# Patient Record
Sex: MALE | NOT HISPANIC OR LATINO | Employment: FULL TIME | URBAN - METROPOLITAN AREA
[De-identification: names, ages, dates, MRNs, and addresses within clinical notes are randomized per-mention and may not be internally consistent; named-entity substitution may affect disease eponyms.]

---

## 2017-05-31 ENCOUNTER — OFFICE VISIT (OUTPATIENT)
Dept: URGENT CARE | Facility: CLINIC | Age: 47
End: 2017-05-31
Payer: COMMERCIAL

## 2017-05-31 VITALS
TEMPERATURE: 97.9 F | HEIGHT: 69 IN | HEART RATE: 94 BPM | DIASTOLIC BLOOD PRESSURE: 74 MMHG | SYSTOLIC BLOOD PRESSURE: 116 MMHG | WEIGHT: 175 LBS | RESPIRATION RATE: 16 BRPM | OXYGEN SATURATION: 93 % | BODY MASS INDEX: 25.92 KG/M2

## 2017-05-31 DIAGNOSIS — H66.001 ACUTE SUPPURATIVE OTITIS MEDIA OF RIGHT EAR WITHOUT SPONTANEOUS RUPTURE OF TYMPANIC MEMBRANE, RECURRENCE NOT SPECIFIED: Primary | ICD-10-CM

## 2017-05-31 DIAGNOSIS — J06.9 VIRAL URI WITH COUGH: ICD-10-CM

## 2017-05-31 PROCEDURE — 99214 OFFICE O/P EST MOD 30 MIN: CPT | Performed by: PHYSICIAN ASSISTANT

## 2017-05-31 RX ORDER — AZITHROMYCIN 250 MG/1
TABLET, FILM COATED ORAL
Qty: 6 TAB | Refills: 0 | Status: SHIPPED | OUTPATIENT
Start: 2017-05-31 | End: 2023-02-10

## 2017-05-31 RX ORDER — CODEINE PHOSPHATE/GUAIFENESIN 10-100MG/5
10 LIQUID (ML) ORAL 4 TIMES DAILY PRN
Qty: 200 ML | Refills: 0 | Status: SHIPPED | OUTPATIENT
Start: 2017-05-31 | End: 2023-02-10

## 2017-05-31 ASSESSMENT — ENCOUNTER SYMPTOMS
DIZZINESS: 0
COUGH: 1
WHEEZING: 0
SPUTUM PRODUCTION: 1
SORE THROAT: 0
SHORTNESS OF BREATH: 0
FEVER: 0

## 2017-05-31 NOTE — Clinical Note
May 31, 2017         Patient: Austin Holm   YOB: 1970   Date of Visit: 5/31/2017           To Whom it May Concern:    Austin Holm was seen in my clinic on 5/31/2017. He may return to work on 05/31/2017.    If you have any questions or concerns, please don't hesitate to call.        Sincerely,           Nisreen Brown PA-C  Electronically Signed

## 2017-05-31 NOTE — PROGRESS NOTES
Subjective:      Austin Holm is a 46 y.o. male who presents with Cough    PMH:  has a past medical history of Otalgia of left ear (4/10/2014).  MEDS:   Current outpatient prescriptions:   •  Pseudoephedrine-APAP-DM (DAYQUIL PO), Take  by mouth., Disp: , Rfl:   •  Pseudoeph-Doxylamine-DM-APAP (NYQUIL PO), Take  by mouth., Disp: , Rfl:   •  ibuprofen (MOTRIN) 200 MG TABS, Take 200 mg by mouth every 6 hours as needed., Disp: , Rfl:   •  promethazine-codeine (PHENERGAN-CODEINE) 6.25-10 MG/5ML Syrup, Take 5 mL by mouth every 12 hours as needed for Cough., Disp: 150 mL, Rfl: 0  •  benzonatate (TESSALON) 200 MG capsule, Take 1 Cap by mouth 3 times a day as needed for Cough., Disp: 60 Cap, Rfl: 1  •  azithromycin (ZITHROMAX) 250 MG Tab, Take as directed on package. Dispense one package., Disp: 6 Tab, Rfl: 0  •  azithromycin (ZITHROMAX) 250 MG Tab, Use WILFRED as directed, Disp: 6 Tab, Rfl: 0  ALLERGIES: No Known Allergies  SURGHX: History reviewed. No pertinent past surgical history.  SOCHX:  reports that he has been smoking Cigarettes.  He has a 15 pack-year smoking history. He has never used smokeless tobacco. He reports that he does not drink alcohol or use illicit drugs.  FH: family history includes Hypertension in his mother. There is no history of Cancer, Diabetes, Heart Disease, or Stroke. Reviewed with patient/family. Not pertinent to this complaint.            HPI Comments: Patient presents with:  Cough: cough/ nasal congestion since Saturday         Cough  This is a new problem. The current episode started in the past 7 days. The problem has been gradually worsening. The problem occurs every few minutes. The cough is productive of sputum and productive of purulent sputum. Associated symptoms include ear congestion, ear pain, nasal congestion and postnasal drip. Pertinent negatives include no fever, sore throat, shortness of breath or wheezing. The symptoms are aggravated by exercise and lying down. Risk factors for  "lung disease include occupational exposure. He has tried body position changes, OTC cough suppressant and rest for the symptoms. The treatment provided no relief.       Review of Systems   Constitutional: Negative for fever.   HENT: Positive for congestion, ear pain and postnasal drip. Negative for ear discharge and sore throat.    Respiratory: Positive for cough and sputum production. Negative for shortness of breath and wheezing.    Neurological: Negative for dizziness.   All other systems reviewed and are negative.         Objective:     /74 mmHg  Pulse 94  Temp(Src) 36.6 °C (97.9 °F)  Resp 16  Ht 1.74 m (5' 8.5\")  Wt 79.379 kg (175 lb)  BMI 26.22 kg/m2  SpO2 93%     Physical Exam   Constitutional: He appears well-developed and well-nourished. No distress.   HENT:   Head: Normocephalic.   Right Ear: Tympanic membrane is erythematous. A middle ear effusion (purulent fluid behind TM) is present.   Left Ear: Tympanic membrane normal.   Nose: Mucosal edema and rhinorrhea present.   Mouth/Throat: Uvula is midline. Posterior oropharyngeal erythema present. No posterior oropharyngeal edema.   Eyes: Conjunctivae and EOM are normal. Pupils are equal, round, and reactive to light.   Neck: Normal range of motion. Neck supple.   Cardiovascular: Normal rate, regular rhythm and normal heart sounds.    Pulmonary/Chest: Effort normal. He has no decreased breath sounds. He has no wheezes. He has rhonchi. He has no rales.   Abdominal: Soft.   Musculoskeletal: Normal range of motion.   Lymphadenopathy:     He has no cervical adenopathy.   Neurological: He is alert.   Skin: Skin is warm and dry.   Psychiatric: He has a normal mood and affect.   Nursing note and vitals reviewed.         Assessment/Plan:     1. Acute suppurative otitis media of right ear without spontaneous rupture of tympanic membrane, recurrence not specified  azithromycin (ZITHROMAX) 250 MG Tab    guaifenesin-codeine (TUSSI-ORGANIDIN NR) 100-10 MG/5ML " syrup   2. Viral URI with cough  azithromycin (ZITHROMAX) 250 MG Tab    guaifenesin-codeine (TUSSI-ORGANIDIN NR) 100-10 MG/5ML syrup     PT can continue OTC medications, increase fluids and rest until symptoms improve.     PT instructed not to drive or operate heavy machinery while taking this medication as it causes drowsiness.  PT also instructed not to drink alcohol while taking this medication because it contains narcotics. PT verbalized understanding of these instructions.      PT should follow up with PCP in 1-2 days for re-evaluation if symptoms have not improved.  Discussed red flags and reasons to return to UC or ED.  Pt and/or family verbalized understanding of diagnosis and follow up instructions and declined informational handout on diagnosis.  PT discharged.

## 2017-05-31 NOTE — MR AVS SNAPSHOT
"        Austin Holm   2017 8:15 AM   Office Visit   MRN: [id number]    Department:  [facility]   Dept Phone:  [phone]    Description:  Male : 1970   Provider:  Nisreen Brown PA-C           Reason for Visit     Cough cough/ nasal congestion since Saturday       Allergies as of 2017     No Known Allergies      You were diagnosed with     Acute suppurative otitis media of right ear without spontaneous rupture of tympanic membrane, recurrence not specified   [[id number]]  -  Primary     Viral URI with cough   [[id number]]         Vital Signs     Blood Pressure Pulse Temperature Respirations Height Weight    116/74 mmHg 94 36.6 °C (97.9 °F) 16 1.74 m (5' 8.5\") 79.379 kg (175 lb)    Body Mass Index Oxygen Saturation Smoking Status             26.22 kg/m2 93% Current Every Day Smoker         Basic Information     Date Of Birth Sex Race Ethnicity Preferred Language    1970 Male Unknown Non- English      Problem List              ICD-10-CM Priority Class Noted - Resolved    Tobacco abuse Z72.0   2011 - Present    Otalgia of left ear H92.02   4/10/2014 - Present      Health Maintenance        Date Due Completion Dates    IMM DTaP/Tdap/Td Vaccine (1 - Tdap) 1989 ---    IMM PNEUMOCOCCAL 19-64 (ADULT) MEDIUM RISK SERIES (1 of 1 - PPSV23) 1989 ---            Current Immunizations     No immunizations on file.      Below and/or attached are the medications your provider expects you to take. Review all of your home medications and newly ordered medications with your provider and/or pharmacist. Follow medication instructions as directed by your provider and/or pharmacist. Please keep your medication list with you and share with your provider. Update the information when medications are discontinued, doses are changed, or new medications (including over-the-counter products) are added; and carry medication information at all times in the event of emergency situations     Allergies:  " No Known Allergies          Medications  Valid as of: May 31, 2017 -  8:52 AM    Generic Name Brand Name Tablet Size Instructions for use    Azithromycin (Tab) ZITHROMAX 250 MG Use WILFRED as directed        Azithromycin (Tab) ZITHROMAX 250 MG Take as directed on package. Dispense one package.        Azithromycin (Tab) ZITHROMAX 250 MG Take 2 tabs by mouth once today, then one tab by mouth once daily days 2-5.  Complete all medication.        Benzonatate (Cap) TESSALON 200 MG Take 1 Cap by mouth 3 times a day as needed for Cough.        Guaifenesin-Codeine (Syrup) TUSSI-ORGANIDIN -10 MG/5ML Take 10 mL by mouth 4 times a day as needed for Cough.        Ibuprofen (Tab) MOTRIN 200 MG Take 200 mg by mouth every 6 hours as needed.        Promethazine-Codeine (Syrup) PHENERGAN-CODEINE 6.25-10 MG/5ML Take 5 mL by mouth every 12 hours as needed for Cough.        Pseudoeph-Doxylamine-DM-APAP   Take  by mouth.        Pseudoephedrine-APAP-DM   Take  by mouth.        .                 Medicines prescribed today were sent to:     [facility] [id number] [place] [address] - [address] [facility] [address] [facility] [address]    [address] [facility] [address] [facility] [address]    Phone: [phone] Fax: [phone]    Open 24 Hours?: No      Medication refill instructions:       If your prescription bottle indicates you have medication refills left, it is not necessary to call your provider’s office. Please contact your pharmacy and they will refill your medication.    If your prescription bottle indicates you do not have any refills left, you may request refills at any time through one of the following ways: The online Noknoker system (except Urgent Care), by calling your provider’s office, or by asking your pharmacy to contact your provider’s office with a refill request. Medication refills are processed only during regular business hours and may not be available until the next business day. Your provider may request additional  information or to have a follow-up visit with you prior to refilling your medication.   *Please Note: Medication refills are assigned a new Rx number when refilled electronically. Your pharmacy may indicate that no refills were authorized even though a new prescription for the same medication is available at the pharmacy. Please request the medicine by name with the pharmacy before contacting your provider for a refill.           Fotoup Access Code: [email or web address]  Expires: 6/30/2017  8:52 AM    Fotoup  A secure, online tool to manage your health information     [employer]’s Fotoup® is a secure, online tool that connects you to your personalized health information from the privacy of your home -- day or night - making it very easy for you to manage your healthcare. Once the activation process is completed, you can even access your medical information using the Fotoup adriana, which is available for free in the Apple Adriana store or Google Play store.     Fotoup provides the following levels of access (as shown below):   My Chart Features   [facility] Doctor [facility]  Specialists [facility]  Urgent  Care Non-[facility]  Primary Care  Doctor   Email your healthcare team securely and privately 24/7 X X X    Manage appointments: schedule your next appointment; view details of past/upcoming appointments X      Request prescription refills. X      View recent personal medical records, including lab and immunizations X X X X   View health record, including health history, allergies, medications X X X X   Read reports about your outpatient visits, procedures, consult and ER notes X X X X   See your discharge summary, which is a recap of your hospital and/or ER visit that includes your diagnosis, lab results, and care plan. X X       How to register for Fotoup:  1. Go to  [email or web address].  2. Click on the Sign Up Now box, which takes you to the New Member Sign Up page. You will need to provide the following  information:  a. Enter your EatWith Access Code exactly as it appears at the top of this page. (You will not need to use this code after you’ve completed the sign-up process. If you do not sign up before the expiration date, you must request a new code.)   b. Enter your date of birth.   c. Enter your home email address.   d. Click Submit, and follow the next screen’s instructions.  3. Create a EatWith ID. This will be your EatWith login ID and cannot be changed, so think of one that is secure and easy to remember.  4. Create a [place] password. You can change your password at any time.  5. Enter your Password Reset Question and Answer. This can be used at a later time if you forget your password.   6. Enter your e-mail address. This allows you to receive e-mail notifications when new information is available in EatWith.  7. Click Sign Up. You can now view your health information.    For assistance activating your EatWith account, call [phone]        Quit Tobacco Information     Do you want to quit using tobacco?    Quitting tobacco decreases risks of cancer, heart and lung disease, increases life expectancy, improves sense of taste and smell, and increases spending money, among other benefits.    If you are thinking about quitting, we can help.  • [facility] Quit Tobacco Program: 518.971.6717  o Program occurs weekly for four weeks and includes pharmacist consultation on products to support quitting smoking or chewing tobacco. A provider referral is needed for pharmacist consultation.  • Tobacco Users Help Hotline: 7-800-QUIT-NOW (927-1695) or https://[place].quitlogix.org/  o Free, confidential telephone and online coaching for [place] residents. Sessions are designed on a schedule that is convenient for you. Eligible clients receive free nicotine replacement therapy.  • Nationally: www.smokefree.gov  o Information and professional assistance to support both immediate and long-term needs as you become, and remain,  a non-smoker. Smokefree.gov allows you to choose the help that best fits your needs.

## 2018-12-03 ENCOUNTER — OFFICE VISIT (OUTPATIENT)
Dept: URGENT CARE | Facility: CLINIC | Age: 48
End: 2018-12-03

## 2018-12-03 VITALS
BODY MASS INDEX: 28.19 KG/M2 | SYSTOLIC BLOOD PRESSURE: 110 MMHG | OXYGEN SATURATION: 98 % | HEIGHT: 68 IN | DIASTOLIC BLOOD PRESSURE: 70 MMHG | WEIGHT: 186 LBS | TEMPERATURE: 98 F | RESPIRATION RATE: 16 BRPM | HEART RATE: 78 BPM

## 2018-12-03 DIAGNOSIS — H61.23 EXCESSIVE CERUMEN IN BOTH EAR CANALS: ICD-10-CM

## 2018-12-03 PROCEDURE — 99213 OFFICE O/P EST LOW 20 MIN: CPT | Performed by: NURSE PRACTITIONER

## 2018-12-03 ASSESSMENT — ENCOUNTER SYMPTOMS
SORE THROAT: 0
DIZZINESS: 0
WEAKNESS: 0
SINUS PAIN: 0
FEVER: 0
DIAPHORESIS: 0
CHILLS: 0

## 2018-12-04 NOTE — PROGRESS NOTES
"Subjective:      Austin Holm is a 48 y.o. male who presents with Otalgia (possible infection/buildup.)            Patient comes in today with a 2 week history of bilateral ear fullness and decreased hearing.  No other URI symptoms.  He has a history of cerumen impaction.  He has not tried any measures at home to treat the symptoms.        Review of Systems   Constitutional: Negative for chills, diaphoresis, fever and malaise/fatigue.   HENT: Positive for hearing loss. Negative for congestion, ear discharge, ear pain, sinus pain and sore throat.    Neurological: Negative for dizziness and weakness.     Medications, Allergies, and current problem list reviewed today in Epic     Objective:     /70 (BP Location: Left arm, Patient Position: Sitting, BP Cuff Size: Adult)   Pulse 78   Temp 36.7 °C (98 °F) (Temporal)   Resp 16   Ht 1.727 m (5' 8\")   Wt 84.4 kg (186 lb)   SpO2 98%   BMI 28.28 kg/m²      Physical Exam   Constitutional: He is oriented to person, place, and time. He appears well-developed and well-nourished. No distress.   HENT:   Head: Normocephalic.   Nose: Nose normal.   Mouth/Throat: Oropharynx is clear and moist. No oropharyngeal exudate.   Bilateral cerumen impaction.  Ear lavage performed in clinic; patient tolerated well.  TMs intact with no erythema, purulence, effusion, retraction or bulge.  No ear canal swelling, erythema, or trauma.  Hearing grossly intact to voice.   Eyes: Conjunctivae are normal.   Cardiovascular: Normal rate.    Pulmonary/Chest: Effort normal.   Neurological: He is alert and oriented to person, place, and time.   Skin: He is not diaphoretic.   Vitals reviewed.              Assessment/Plan:     1. Excessive cerumen in both ear canals    Discussed exam findings with patient.  May recur.  Trial quarterly ear rinses at home with cerumen softening solution.  Follow up of symptoms recur.  Patient verbalized understanding of and agreed with plan of care.        "

## 2022-05-16 ENCOUNTER — OFFICE VISIT (OUTPATIENT)
Dept: URGENT CARE | Facility: PHYSICIAN GROUP | Age: 52
End: 2022-05-16
Payer: COMMERCIAL

## 2022-05-16 VITALS
BODY MASS INDEX: 28.19 KG/M2 | TEMPERATURE: 98.4 F | HEART RATE: 94 BPM | HEIGHT: 68 IN | RESPIRATION RATE: 16 BRPM | WEIGHT: 186 LBS | DIASTOLIC BLOOD PRESSURE: 74 MMHG | OXYGEN SATURATION: 99 % | SYSTOLIC BLOOD PRESSURE: 140 MMHG

## 2022-05-16 DIAGNOSIS — H61.23 CERUMEN DEBRIS ON TYMPANIC MEMBRANE OF BOTH EARS: ICD-10-CM

## 2022-05-16 PROCEDURE — 99212 OFFICE O/P EST SF 10 MIN: CPT | Performed by: STUDENT IN AN ORGANIZED HEALTH CARE EDUCATION/TRAINING PROGRAM

## 2022-05-16 NOTE — PROGRESS NOTES
Subjective:   CHIEF COMPLAINT  Chief Complaint   Patient presents with   • Ear Fullness     Feels blocked and full. Cannot hear well       HPI  Austin Holm is a 51 y.o. male who presents complaint of his left ear feeling plugged.  Patient has a history of recurrent cerumen impaction and says his current symptoms are consistent with previous impaction.  Symptoms developed proxy 1 week ago.  Says he wears earplugs at work.  He is not having pain.    REVIEW OF SYSTEMS  General: no fever or chills  GI: no nausea or vomiting  See HPI for further details.    PAST MEDICAL HISTORY  Patient Active Problem List    Diagnosis Date Noted   • Otalgia of left ear 04/10/2014   • Tobacco abuse 05/05/2011       SURGICAL HISTORY  patient denies any surgical history    ALLERGIES  No Known Allergies    CURRENT MEDICATIONS  Home Medications     Reviewed by Osman Cronin D.O. (Physician) on 05/16/22 at 1641  Med List Status: <None>   Medication Last Dose Status   azithromycin (ZITHROMAX) 250 MG Tab Taking Active   azithromycin (ZITHROMAX) 250 MG Tab Taking Active   azithromycin (ZITHROMAX) 250 MG Tab Taking Active   benzonatate (TESSALON) 200 MG capsule Taking Active   guaifenesin-codeine (TUSSI-ORGANIDIN NR) 100-10 MG/5ML syrup Taking Active   ibuprofen (MOTRIN) 200 MG TABS Taking Active   promethazine-codeine (PHENERGAN-CODEINE) 6.25-10 MG/5ML Syrup Taking Active   Pseudoeph-Doxylamine-DM-APAP (NYQUIL PO) Taking Active   Pseudoephedrine-APAP-DM (DAYQUIL PO) Taking Active                SOCIAL HISTORY  Social History     Tobacco Use   • Smoking status: Current Every Day Smoker     Packs/day: 0.50     Years: 30.00     Pack years: 15.00     Types: Cigarettes   • Smokeless tobacco: Never Used   Substance and Sexual Activity   • Alcohol use: No   • Drug use: No     Comment: denies h/o heroin, cocaine, meth, IVDU   • Sexual activity: Yes     Partners: Female     Comment: 1 female partner since around 2008       FAMILY HISTORY  Family  "History   Problem Relation Age of Onset   • Hypertension Mother    • Cancer Neg Hx    • Diabetes Neg Hx    • Heart Disease Neg Hx    • Stroke Neg Hx           Objective:   PHYSICAL EXAM  VITAL SIGNS: BP (!) 140/74   Pulse 94   Temp 36.9 °C (98.4 °F) (Temporal)   Resp 16   Ht 1.727 m (5' 8\")   Wt 84.4 kg (186 lb)   SpO2 99%   BMI 28.28 kg/m²     Gen: no acute distress, normal voice  Skin: dry, intact, moist mucosal membranes  ENT: Bilateral cerumen impaction.  Lungs: CTAB w/ symmetric expansion  CV: RRR w/o murmurs or clicks  Psych: normal affect, normal judgement, alert, awake    Assessment/Plan:     1. Cerumen debris on tympanic membrane of both ears     Bilateral ear lavage was performed successfully removing cerumen.  Patient tolerated procedure well.  On repeat exam TMs were clear and intact bilaterally.  Follow-up as needed.    Differential diagnosis, natural history, supportive care, and indications for immediate follow-up discussed. All questions answered. Patient agrees with the plan of care.    Follow-up as needed if symptoms worsen or fail to improve to PCP, Urgent care or Emergency Room.    Please note that this dictation was created using voice recognition software. I have made a reasonable attempt to correct obvious errors, but I expect that there are errors of grammar and possibly content that I did not discover before finalizing the note.         "

## 2023-02-10 ENCOUNTER — APPOINTMENT (OUTPATIENT)
Dept: RADIOLOGY | Facility: IMAGING CENTER | Age: 53
End: 2023-02-10
Attending: NURSE PRACTITIONER
Payer: COMMERCIAL

## 2023-02-10 ENCOUNTER — OFFICE VISIT (OUTPATIENT)
Dept: URGENT CARE | Facility: CLINIC | Age: 53
End: 2023-02-10
Payer: COMMERCIAL

## 2023-02-10 VITALS
TEMPERATURE: 97.9 F | RESPIRATION RATE: 18 BRPM | SYSTOLIC BLOOD PRESSURE: 138 MMHG | WEIGHT: 190.92 LBS | OXYGEN SATURATION: 90 % | BODY MASS INDEX: 28.94 KG/M2 | HEART RATE: 105 BPM | HEIGHT: 68 IN | DIASTOLIC BLOOD PRESSURE: 80 MMHG

## 2023-02-10 DIAGNOSIS — J21.0 RSV (ACUTE BRONCHIOLITIS DUE TO RESPIRATORY SYNCYTIAL VIRUS): ICD-10-CM

## 2023-02-10 DIAGNOSIS — R06.02 SHORTNESS OF BREATH: ICD-10-CM

## 2023-02-10 DIAGNOSIS — J06.9 URI WITH COUGH AND CONGESTION: ICD-10-CM

## 2023-02-10 DIAGNOSIS — F17.200 CURRENT EVERY DAY SMOKER: ICD-10-CM

## 2023-02-10 LAB
FLUAV RNA SPEC QL NAA+PROBE: NEGATIVE
FLUBV RNA SPEC QL NAA+PROBE: NEGATIVE
RSV RNA SPEC QL NAA+PROBE: POSITIVE
SARS-COV-2 RNA RESP QL NAA+PROBE: NOT DETECTED

## 2023-02-10 PROCEDURE — 71046 X-RAY EXAM CHEST 2 VIEWS: CPT | Mod: TC | Performed by: NURSE PRACTITIONER

## 2023-02-10 PROCEDURE — 0241U POCT CEPHEID COV-2, FLU A/B, RSV - PCR: CPT | Performed by: NURSE PRACTITIONER

## 2023-02-10 PROCEDURE — 94640 AIRWAY INHALATION TREATMENT: CPT | Performed by: NURSE PRACTITIONER

## 2023-02-10 PROCEDURE — 99214 OFFICE O/P EST MOD 30 MIN: CPT | Mod: 25,CS | Performed by: NURSE PRACTITIONER

## 2023-02-10 RX ORDER — ALBUTEROL SULFATE 2.5 MG/3ML
2.5 SOLUTION RESPIRATORY (INHALATION) ONCE
Status: COMPLETED | OUTPATIENT
Start: 2023-02-10 | End: 2023-02-10

## 2023-02-10 RX ORDER — ALBUTEROL SULFATE 90 UG/1
1-2 AEROSOL, METERED RESPIRATORY (INHALATION) EVERY 6 HOURS PRN
Qty: 8.5 G | Refills: 0 | Status: SHIPPED | OUTPATIENT
Start: 2023-02-10

## 2023-02-10 RX ADMIN — ALBUTEROL SULFATE 2.5 MG: 2.5 SOLUTION RESPIRATORY (INHALATION) at 09:41

## 2023-02-10 ASSESSMENT — ENCOUNTER SYMPTOMS
WHEEZING: 0
HEMOPTYSIS: 0
MYALGIAS: 0
SPUTUM PRODUCTION: 0
COUGH: 1
PALPITATIONS: 0
FEVER: 0
SORE THROAT: 0
ORTHOPNEA: 0
SHORTNESS OF BREATH: 1
CHILLS: 0

## 2023-02-10 NOTE — LETTER
February 10, 2023         Patient: Austin Holm   YOB: 1970   Date of Visit: 2/10/2023           To Whom it May Concern:    Austin Holm was seen in my clinic on 2/10/2023. He may return to work on 2/13/23.  Please excuse his absence due to acute illness.    If you have any questions or concerns, please don't hesitate to call.        Sincerely,           JOSE MARIA eLe.  Electronically Signed

## 2023-02-10 NOTE — PROGRESS NOTES
"Subjective     Austin Holm is a 52 y.o. male who presents with Congestion (Started Sunday night), Shortness of Breath, and Cough (Started Sunday)            Austin comes in today with a 5 day history of URI with nasal congestion, chest congestion, persistent cough and shortness of breath.  He feels worse today with increased shortness of breath.  He denies any fever, chills, or myalgia.  Several coworkers were ill last week with similar symptoms.  He denies any known exposure to covid.  He is taking OTC cold meds with no relief.  He is a current long term smoker at  1/2-1 ppd x 30 years.  He currently smokes 1 ppd.  He denies any known COPD or asthma.  He does not have a PCP.  He does not take any meds routinely for maintenance of chronic conditions.        Review of Systems   Constitutional:  Positive for malaise/fatigue. Negative for chills and fever.   HENT:  Positive for congestion. Negative for ear pain and sore throat.    Respiratory:  Positive for cough and shortness of breath. Negative for hemoptysis, sputum production and wheezing.    Cardiovascular:  Negative for chest pain, palpitations and orthopnea.   Musculoskeletal:  Negative for myalgias.      Medications, Allergies, and current problem list reviewed today in Epic      Objective     Blood Pressure 138/80   Pulse (Abnormal) 105   Temperature 36.6 °C (97.9 °F) (Temporal)   Respiration 18   Height 1.727 m (5' 8\")   Weight 86.6 kg (190 lb 14.7 oz)   Oxygen Saturation 90%   Body Mass Index 29.03 kg/m²      Physical Exam  Vitals reviewed.   Constitutional:       General: He is not in acute distress.     Appearance: He is well-developed. He is not ill-appearing or toxic-appearing.      Comments: Austin appears fatigued.  He remains standing in the exam room, leaning onto exam table with arms in a classic tripod position.    HENT:      Right Ear: Tympanic membrane, ear canal and external ear normal. There is no impacted cerumen.      Left Ear: " "Tympanic membrane, ear canal and external ear normal. There is no impacted cerumen.      Nose: Congestion present.      Mouth/Throat:      Mouth: Mucous membranes are moist.      Pharynx: Posterior oropharyngeal erythema present. No oropharyngeal exudate.   Eyes:      General: No scleral icterus.        Right eye: No discharge.         Left eye: No discharge.      Conjunctiva/sclera: Conjunctivae normal.      Pupils: Pupils are equal, round, and reactive to light.   Neck:      Thyroid: No thyromegaly.      Vascular: No JVD.      Trachea: No tracheal deviation.   Cardiovascular:      Rate and Rhythm: Regular rhythm. Tachycardia present.      Heart sounds: Normal heart sounds. No murmur heard.    No friction rub. No gallop.   Pulmonary:      Effort: Pulmonary effort is normal. No respiratory distress.      Breath sounds: No stridor. No wheezing, rhonchi or rales.      Comments: Decreased breath sounds in all fields.  NO wheezes or rhonchi.  Dry cough in clinic.  Chest:      Chest wall: No tenderness.   Musculoskeletal:      Cervical back: Neck supple.   Lymphadenopathy:      Cervical: No cervical adenopathy.   Skin:     General: Skin is warm.      Comments: Slightly clammy skin.  Austin reports he feels \"hot\" without fever.     Neurological:      Mental Status: He is alert and oriented to person, place, and time.           POCT Cepheid:  Covid: negative  Flu a/b: negative  RSV: positive     In clinic medication: albuterol nebulizer.  Patient tolerated well with good improvement in subjective shortness of breath.  O2 sat  87-90% before treatment and 90% post treatment.     DX-CHEST-2 VIEWS  Order: [id number]  Status: Final result     Visible to patient: No (inaccessible in [facility])     Next appt: None     Dx: Shortness of breath; RSV (acute bronc...     0 Result Notes  Details    Reading Physician Reading Date Result Priority   Agustín Bryson M.D.  [phone] 2/10/2023 Urgent [facility]     Narrative & Impression   "   2/10/2023 10:13 AM     HISTORY/REASON FOR EXAM:  Shortness of Breath; Positive RSV.  Long term smoker. No prior diagnosis of COPD. Persistent shortness of breath with O2 sats in 87-90%.  Pneumonia?.        TECHNIQUE/EXAM DESCRIPTION AND NUMBER OF VIEWS:  Two views of the chest.     COMPARISON:  None.     FINDINGS:     Cardiomediastinal silhouette is normal.     No focal consolidation, pleural effusion, pulmonary edema or pneumothorax.     No acute osseous abnormality.     IMPRESSION:     No acute cardiopulmonary abnormality.           Exam Ended: 02/10/23 10:15 AM Last Resulted: 02/10/23 10:21 AM                  Assessment & Plan        1. RSV (acute bronchiolitis due to respiratory syncytial virus)  DX-CHEST-2 VIEWS    albuterol 108 (90 Base) MCG/ACT Aero Soln inhalation aerosol      2. URI with cough and congestion  POCT CEPHEID COV-2, FLU A/B, RSV - PCR      3. Shortness of breath  albuterol (PROVENTIL) 2.5mg/3ml nebulizer solution 2.5 mg    DX-CHEST-2 VIEWS    albuterol 108 (90 Base) MCG/ACT Aero Soln inhalation aerosol    Referral to establish with [facility] PCP      4. Current every day smoker  Referral to establish with [facility] PCP        Advised patient that based on the history and exam findings, this is RSV, a viral illness.  There is no indication for antibiotics at this time.  Differential reviewed.  Cannot exclude baseline underlying chronic respiratory disease such as COPD as contributing factor to current symptoms.    Albuterol as prescribed.    OTC cold medications prn symptom management.  Maintain adequate po hydration.  Encouraged smoking cessation.  RTC in 3 days if symptoms persist without improvement for re-check, sooner if worse.  ED precautions reviewed.  Establish with a PCP for routine health care, screening, and maintenance.    He verbalized understanding of and agreed with plan of care.